# Patient Record
(demographics unavailable — no encounter records)

---

## 2024-12-09 NOTE — HISTORY OF PRESENT ILLNESS
[FreeTextEntry1] : Mrs. Izabela Vieyra returned to the office having been last seen on June 4, 2021. She is an 88-year-old right-handed patient with a history of TIA, non-surgical interhemispheric subdural in 2015, status post L1 kyphoplasty and status post LARIAT procedure.   At her June 2021 visit, she was residing with her . She was totally independent. She was able to dress, bathe and toilet herself.  Her daughter Maria Luisa was concerned about her memory problems.  In November 2020, she scored 28 out of 30 on MMSE.  She made 1 error in recall.  At her 2021 visit, she scored 26 out of 30 making 2 errors in recall.  Additional diagnostic testing was discussed.  She was lost to follow-up.  Mrs. Vieyra was accompanied by her daughter Maria Luisa ([phone]).  She is residing with her daughter Daisy since her  passed away.  She is chronically depressed but cognitively well.  Since suffering a fall striking the back of her head in April 2024, she has been unsteady on her feet and complains of dizziness but not vertigo.  She denies headaches, visual, hearing or swallowing difficulty.  She has chronic nocturia.  Past surgical history is notable for lariat procedure, pacemaker implantation, L1 kyphoplasty, cataract extractions and hysterectomy.  She suffers from hypertension, hyperlipidemia, chronic atrial fibrillation, depression, anxiety, hypothyroidism, remote stroke and nonsurgical subdural hematoma.  There is no history of diabetes, pulmonary, renal, hepatic, gastrointestinal or hematologic disease.  She has no allergies.  Medications include baby aspirin, Cardizem, digoxin, Tenormin, atorvastatin, levothyroxine, escitalopram, folic acid, vitamin D, vitamin B complex, acidophilus, Citracal, albuterol inhaler and pantoprazole.  She is .  She is a non-smoker and nondrinker.  Family history is notable for heart disease.

## 2024-12-09 NOTE — CONSULT LETTER
[Dear  ___] : Dear  [unfilled], [Consult Letter:] : I had the pleasure of evaluating your patient, [unfilled]. [Please see my note below.] : Please see my note below. [Consult Closing:] : Thank you very much for allowing me to participate in the care of this patient.  If you have any questions, please do not hesitate to contact me. [Sincerely,] : Sincerely, [FreeTextEntry3] : John Abbott MD

## 2024-12-09 NOTE — PHYSICAL EXAM
[FreeTextEntry1] : Constitutional:  Patient was elderly but well-nourished and in no acute distress.   Head:  Normocephalic, atraumatic. Tympanic membranes were clear.   Neck:  Supple with full range of motion.   Cardiovascular:  Cardiac rhythm was regular without murmur. There were no carotid bruits. Peripheral pulses were full and symmetric.   Respiratory:  Lungs were clear.   Abdomen:  Soft and nontender.   Spine:  Nontender.   Skin:  There were no rashes.   NEUROLOGICAL EXAMINATION:  Mental Status: Patient was alert and oriented. Speech was fluent. There was no dysarthria.  She scored again 26 out of 30 on MMSE.  She recalled 2 of 3 words and reverse 2 letter spelling world backwards.  Cranial Nerves:   II: She could finger count bilaterally.  Pupils were surgical but reactive. Visual fields were full. Funduscopic examination was normal.   III, IV, VI:  Eye movements were full without nystagmus.  There was no ocular dysmetria or skew deviation.  V: Facial sensation was intact.   VII: Facial strength was normal.   VIII: Hearing was equal.  Nylen Barany maneuver resulted in dizziness when she was in the left and right lateral positions which became aggravated when she sat up.  There was no nystagmus.  IX, X: Palatal movement was normal. Phonation was normal.   XI: Sternocleidomastoids and trapezii were normal.   XII: Tongue was midline and movements normal. There was no lingual atrophy or fasciculations.   Motor Examination: Muscle bulk, tone and strength were normal.   Sensory Examination: Pinprick and joint position sense were intact.  Vibration sense was diminished in the feet.  Reflexes: DTRs were 1 at the biceps and absent elsewhere.    Plantar Responses: Plantar responses were flexor.   Coordination/Cerebellar Function: There was no dysmetria on finger to nose or heel to shin testing.   Gait/Stance: Posture was mildly stooped.  Strides were short and turns decomposed.  She swayed on Romberg testing and could not tandem.

## 2024-12-09 NOTE — ASSESSMENT
[FreeTextEntry1] : Mrs. Vieyra is an 88-year-old with chronic mild cognitive and gait difficulties.  Since suffering a fall with head trauma in April 2024, she complains of increasing dizziness and gait unsteadiness.  Her examination is suggestive of possible vestibular dysfunction.  I suggested that she undergo a CT of the brain.  She will also be referred for vestibular evaluation at [facility].  If her pacemaker is MR-compatible, she may eventually require an MRI of the brain.  Further management will depend upon these results and her clinical course.

## 2025-04-17 NOTE — ASSESSMENT
[FreeTextEntry1] : Mrs. Vieyra is an 89-year-old who suffers from chronic depression and anxiety.  She has longstanding ataxia with an unremarkable CT of the brain.  If after a period of improvement, she reports again dizziness and ataxia.  For completeness, she will undergo an updated CT of the brain.  Her device is MRI incompatible.  I suggested continuing home vestibular exercises.  In view of her complaints, physical findings and possible family history of ataxia, I suggested a [facility] genome study to exclude a hereditary ataxia.  Further management will depend upon these results and her clinical course.  I will keep you informed of her status.

## 2025-04-17 NOTE — HISTORY OF PRESENT ILLNESS
[FreeTextEntry1] : Mrs. Izabela Vieyra returned to the office having been last seen on December 9, 2024. She is an 89-year-old right-handed patient with a history of TIA, non-surgical interhemispheric subdural in 2015, status post L1 kyphoplasty and status post LARIAT procedure.   At her June 2021 visit, she was residing with her . She was totally independent. She was able to dress, bathe and toilet herself.  Her daughter Maria Luisa was concerned about her memory problems.  In November 2020, she scored 28 out of 30 on MMSE.  She made 1 error in recall.  At her 2021 visit, she scored 26 out of 30 making 2 errors in recall.  Additional diagnostic testing was discussed.  She was lost to follow-up.  At her December 2024 visit, Mrs. Vieyra was accompanied by her daughter Maria Luisa ([phone]).  She was residing with her daughter Daisy since her  passed away.  She was chronically depressed but cognitively well.  Since suffering a fall striking the back of her head in April 2024, she had been unsteady on her feet and complained of dizziness but not vertigo.  She denied headaches, visual, hearing or swallowing difficulty.  She had chronic nocturia.  I felt that her recent dizziness and gait unsteadiness by be due to vestibular dysfunction.  An updated CT of the brain revealed increased ischemic changes compared to 2019.  She underwent vestibular evaluation and therapy with some improvement.  Unfortunately, she recently complained of increasing unsteadiness and dizziness.  She has mild occipital headache.  She is anxious and depressed.  She was referred to a neuro-ophthalmologist by the vestibular therapist and provided with prisms.  She was accompanied to the office today by her daughter Daisy and son-in-law Rajinder.  Past surgical history is notable for lariat procedure, pacemaker implantation, L1 kyphoplasty, cataract extractions and hysterectomy.  She suffers from hypertension, hyperlipidemia, chronic atrial fibrillation, depression, anxiety, hypothyroidism, remote stroke and nonsurgical subdural hematoma.  There is no history of diabetes, pulmonary, renal, hepatic, gastrointestinal or hematologic disease.  She has no allergies.  Medications include baby aspirin, Cardizem, digoxin, Tenormin, atorvastatin, levothyroxine, escitalopram, folic acid, vitamin D, vitamin B complex, acidophilus, Citracal, albuterol inhaler and pantoprazole.  She is .  She is a non-smoker and nondrinker.  Family history is notable for heart disease.

## 2025-04-17 NOTE — PHYSICAL EXAM
[FreeTextEntry1] : Constitutional:  Patient was elderly but well-nourished and in no acute distress.   Head:  Normocephalic, atraumatic. Tympanic membranes were clear.   Neck:  Supple with full range of motion.   Cardiovascular:  Cardiac rhythm was regular without murmur. There were no carotid bruits. Peripheral pulses were full and symmetric.   Respiratory:  Lungs were clear.   Abdomen:  Soft and nontender.   Spine:  Nontender.   Skin:  There were no rashes.   NEUROLOGICAL EXAMINATION:  Mental Status: Patient was alert and oriented. Speech was fluent. There was no dysarthria.    Cranial Nerves:   II: She could finger count bilaterally.  Pupils were surgical but reactive. Visual fields were full. Funduscopic examination was normal.   III, IV, VI:  Eye movements were full but there was prominent ocular dysmetria.  There was no horizontal or vertical nystagmus.  V: Facial sensation was intact.   VII: Facial strength was normal.   VIII: Hearing was equal.    IX, X: Palatal movement was normal. Phonation was normal.   XI: Sternocleidomastoids and trapezii were normal.   XII: Tongue was midline and movements normal. There was no lingual atrophy or fasciculations.   Motor Examination: Muscle bulk, tone and strength were normal.   Sensory Examination: Pinprick and joint position sense were intact.  Vibration sense was diminished in the feet.  Reflexes: DTRs were 1 at the biceps and absent elsewhere.    Plantar Responses: Plantar responses were flexor.   Coordination/Cerebellar Function: There was no dysmetria on finger to nose or heel to shin testing.   Gait/Stance: Posture was mildly stooped.  Strides were short and turns decomposed.  She swayed on Romberg testing and could not tandem.